# Patient Record
Sex: FEMALE | URBAN - METROPOLITAN AREA
[De-identification: names, ages, dates, MRNs, and addresses within clinical notes are randomized per-mention and may not be internally consistent; named-entity substitution may affect disease eponyms.]

---

## 2021-04-16 ENCOUNTER — APPOINTMENT (RX ONLY)
Dept: URBAN - METROPOLITAN AREA CLINIC 133 | Facility: CLINIC | Age: 86
Setting detail: DERMATOLOGY
End: 2021-04-16

## 2021-04-16 VITALS — WEIGHT: 110 LBS | HEIGHT: 60 IN | TEMPERATURE: 97.3 F

## 2021-04-16 DIAGNOSIS — B02.9 ZOSTER WITHOUT COMPLICATIONS: ICD-10-CM

## 2021-04-16 PROCEDURE — ? PRESCRIPTION

## 2021-04-16 PROCEDURE — ? PHOTO-DOCUMENTATION

## 2021-04-16 PROCEDURE — ? COUNSELING

## 2021-04-16 PROCEDURE — 99203 OFFICE O/P NEW LOW 30 MIN: CPT

## 2021-04-16 RX ORDER — VALACYCLOVIR HYDROCHLORIDE 1 G/1
TABLET, FILM COATED ORAL TID
Qty: 21 | Refills: 0 | Status: ERX | COMMUNITY
Start: 2021-04-16

## 2021-04-16 RX ADMIN — VALACYCLOVIR HYDROCHLORIDE: 1 TABLET, FILM COATED ORAL at 00:00

## 2021-04-16 ASSESSMENT — LOCATION SIMPLE DESCRIPTION DERM: LOCATION SIMPLE: LEFT FOREARM

## 2021-04-16 ASSESSMENT — LOCATION DETAILED DESCRIPTION DERM: LOCATION DETAILED: LEFT VENTRAL PROXIMAL FOREARM

## 2021-04-16 ASSESSMENT — LOCATION ZONE DERM: LOCATION ZONE: ARM

## 2021-04-16 NOTE — HPI: RASH (ECZEMA)
How Severe Is Your Eczema?: moderate
Is This A New Presentation, Or A Follow-Up?: Rash
Additional History: Patient’s grandson states family members noticed the rash appeared last night. It went from being red and to blister. Rash is only present on left arm. Patient states she feels well and denies recent illness. She has received her COVID vaccines but it has been weeks. Reports history of chicken pox as a child. Unsure if she has had shingles vaccine, but denies hx of shingles. Denies symptoms of rash.